# Patient Record
Sex: FEMALE | Race: WHITE | NOT HISPANIC OR LATINO | Employment: UNEMPLOYED | URBAN - METROPOLITAN AREA
[De-identification: names, ages, dates, MRNs, and addresses within clinical notes are randomized per-mention and may not be internally consistent; named-entity substitution may affect disease eponyms.]

---

## 2017-05-06 RX ORDER — SUMATRIPTAN SUCCINATE 100 MG/1
TABLET ORAL
Qty: 6 TABLET | Refills: 0 | Status: SHIPPED | OUTPATIENT
Start: 2017-05-06

## 2017-09-11 RX ORDER — SUMATRIPTAN SUCCINATE 100 MG/1
TABLET ORAL
Qty: 6 TABLET | Refills: 0 | OUTPATIENT
Start: 2017-09-11

## 2019-10-16 ENCOUNTER — TELEPHONE (OUTPATIENT)
Dept: OBSTETRICS AND GYNECOLOGY | Facility: CLINIC | Age: 39
End: 2019-10-16

## 2019-10-16 NOTE — TELEPHONE ENCOUNTER
----- Message from Maya Eid sent at 10/16/2019  3:18 PM CDT -----  Contact: patient  Type: Needs Medical Advice    Who Called:  patient  Symptoms (please be specific):    How long has patient had these symptoms:    Pharmacy name and phone #:    Best Call Back Number: [phone]  Additional Information: requesting a call back,stated that  performed hysterectomy and would like to talk with her

## 2019-11-25 ENCOUNTER — HOSPITAL ENCOUNTER (EMERGENCY)
Facility: HOSPITAL | Age: 39
Discharge: HOME | End: 2019-11-25
Attending: EMERGENCY MEDICINE
Payer: MEDICAID

## 2019-11-25 VITALS
RESPIRATION RATE: 18 BRPM | HEART RATE: 95 BPM | DIASTOLIC BLOOD PRESSURE: 75 MMHG | TEMPERATURE: 98 F | SYSTOLIC BLOOD PRESSURE: 120 MMHG

## 2019-11-25 DIAGNOSIS — K08.89 PAIN, DENTAL: Primary | ICD-10-CM

## 2019-11-25 DIAGNOSIS — K02.9 DENTAL CARIES: ICD-10-CM

## 2019-11-25 DIAGNOSIS — K04.7 DENTAL ABSCESS: ICD-10-CM

## 2019-11-25 PROCEDURE — 99283 EMERGENCY DEPT VISIT LOW MDM: CPT

## 2019-11-25 RX ORDER — DICLOFENAC SODIUM 50 MG/1
50 TABLET, DELAYED RELEASE ORAL 3 TIMES DAILY PRN
Qty: 20 TABLET | Refills: 2 | Status: SHIPPED | OUTPATIENT
Start: 2019-11-25

## 2019-11-25 RX ORDER — NAPROXEN 250 MG/1
500 TABLET ORAL
Status: DISCONTINUED | OUTPATIENT
Start: 2019-11-25 | End: 2019-11-25 | Stop reason: HOSPADM

## 2019-11-25 RX ORDER — CLINDAMYCIN HYDROCHLORIDE 300 MG/1
300 CAPSULE ORAL 4 TIMES DAILY
Qty: 40 CAPSULE | Refills: 0 | Status: SHIPPED | OUTPATIENT
Start: 2019-11-25 | End: 2019-12-05

## 2019-11-25 NOTE — ED PROVIDER NOTES
Encounter Date: 2019       History   No chief complaint on file.    Presents with dental pain  Onset 5 day  This is chronic.  Pt has not seen a dentist        Review of patient's allergies indicates:  No Known Allergies  Past Medical History:   Diagnosis Date    Abdominal adhesions     Anemia     Breast disorder     tumor removal    Endometriosis, adhesive      Past Surgical History:   Procedure Laterality Date    ABDOMINAL ADHESION SURGERY      2nd surgery 3/14/2013    BLADDER REPAIR      laparotomy for bladder repair after abdominal surgery for endometriosis     SECTION      HYSTERECTOMY  3/14/2013    MINI-LAPAROTOMY      for endometriosis    OOPHORECTOMY      TUMOR REMOVAL      Right breast     Family History   Problem Relation Age of Onset    Hypertension Paternal Grandfather     Heart disease Paternal Grandfather     Stroke Maternal Grandmother     Breast cancer Mother      Social History     Tobacco Use    Smoking status: Former Smoker     Packs/day: 1.00     Years: 3.00     Pack years: 3.00    Smokeless tobacco: Never Used   Substance Use Topics    Alcohol use: No     Alcohol/week: 0.0 standard drinks    Drug use: Yes     Comment: No drug use  in 4 years.     Review of Systems   Constitutional: Negative for fever.   HENT: Positive for dental problem.    Respiratory: Negative for cough, shortness of breath and wheezing.    Cardiovascular: Negative for chest pain, palpitations and leg swelling.   Gastrointestinal: Negative for abdominal pain, diarrhea, nausea and vomiting.   Genitourinary: Negative for dysuria.   Musculoskeletal: Negative for back pain.   Skin: Negative for rash.   Neurological: Negative for weakness.       Physical Exam     Initial Vitals   BP Pulse Resp Temp SpO2   -- -- -- -- --      MAP       --         Physical Exam    Constitutional: She appears well-developed and well-nourished.   HENT:   Head: Normocephalic and atraumatic.   Mouth/Throat: Oropharynx is  clear and moist.   Left lower back tooth with large caries noted.  Mild gum swelling at site  Airway patent    Eyes: Conjunctivae are normal.   Neck: Normal range of motion.   Cardiovascular: Normal rate and regular rhythm.   Pulmonary/Chest: Breath sounds normal. No respiratory distress.   Musculoskeletal: Normal range of motion.   Neurological: She is alert and oriented to person, place, and time. No sensory deficit. GCS score is 15. GCS eye subscore is 4. GCS verbal subscore is 5. GCS motor subscore is 6.   Skin: Skin is warm and dry.   Psychiatric: She has a normal mood and affect. Thought content normal.         ED Course   Procedures  Labs Reviewed - No data to display       Imaging Results    None          Medical Decision Making:   Initial Assessment:   Chronic dental pain  Onset 5 days  Have discussed this pt with DR. Yepez                                 Clinical Impression:       ICD-10-CM ICD-9-CM   1. Pain, dental K08.89 525.9   2. Dental abscess K04.7 522.5   3. Dental caries K02.9 521.00                             Amaya Osborne NP  11/25/19 1056

## 2020-03-06 DIAGNOSIS — M79.642 BILATERAL HAND PAIN: Primary | ICD-10-CM

## 2020-03-06 DIAGNOSIS — M79.641 BILATERAL HAND PAIN: Primary | ICD-10-CM

## 2020-05-07 DIAGNOSIS — M84.333S: Primary | ICD-10-CM

## 2020-06-29 ENCOUNTER — HOSPITAL ENCOUNTER (OUTPATIENT)
Dept: RADIOLOGY | Facility: HOSPITAL | Age: 40
Discharge: HOME | End: 2020-06-29
Attending: ORTHOPAEDIC SURGERY
Payer: COMMERCIAL

## 2020-06-29 ENCOUNTER — OFFICE VISIT (OUTPATIENT)
Dept: ORTHOPEDICS | Facility: CLINIC | Age: 40
End: 2020-06-29
Payer: COMMERCIAL

## 2020-06-29 VITALS
SYSTOLIC BLOOD PRESSURE: 138 MMHG | BODY MASS INDEX: 29.41 KG/M2 | DIASTOLIC BLOOD PRESSURE: 79 MMHG | HEART RATE: 93 BPM | WEIGHT: 166 LBS | TEMPERATURE: 98 F | HEIGHT: 63 IN

## 2020-06-29 DIAGNOSIS — M79.672 LEFT FOOT PAIN: ICD-10-CM

## 2020-06-29 DIAGNOSIS — M79.672 LEFT FOOT PAIN: Primary | ICD-10-CM

## 2020-06-29 DIAGNOSIS — S92.425A CLOSED NONDISPLACED FRACTURE OF DISTAL PHALANX OF LEFT GREAT TOE, INITIAL ENCOUNTER: Primary | ICD-10-CM

## 2020-06-29 PROCEDURE — 3008F BODY MASS INDEX DOCD: CPT | Mod: CPTII,S$GLB,, | Performed by: ORTHOPAEDIC SURGERY

## 2020-06-29 PROCEDURE — 73630 X-RAY EXAM OF FOOT: CPT | Mod: TC,PO,LT

## 2020-06-29 PROCEDURE — 99202 OFFICE O/P NEW SF 15 MIN: CPT | Mod: 25,S$GLB,, | Performed by: ORTHOPAEDIC SURGERY

## 2020-06-29 PROCEDURE — 3008F PR BODY MASS INDEX (BMI) DOCUMENTED: ICD-10-PCS | Mod: CPTII,S$GLB,, | Performed by: ORTHOPAEDIC SURGERY

## 2020-06-29 PROCEDURE — 73630 X-RAY EXAM OF FOOT: CPT | Mod: 26,LT,, | Performed by: RADIOLOGY

## 2020-06-29 PROCEDURE — 99202 PR OFFICE/OUTPT VISIT, NEW, LEVL II, 15-29 MIN: ICD-10-PCS | Mod: 25,S$GLB,, | Performed by: ORTHOPAEDIC SURGERY

## 2020-06-29 PROCEDURE — 73630 XR FOOT COMPLETE 3 VIEW LEFT: ICD-10-PCS | Mod: 26,LT,, | Performed by: RADIOLOGY

## 2020-06-29 PROCEDURE — 28490 TREAT BIG TOE FRACTURE: CPT | Mod: LT,S$GLB,, | Performed by: ORTHOPAEDIC SURGERY

## 2020-06-29 PROCEDURE — 99999 PR PBB SHADOW E&M-EST. PATIENT-LVL III: ICD-10-PCS | Mod: PBBFAC,,, | Performed by: ORTHOPAEDIC SURGERY

## 2020-06-29 PROCEDURE — 99999 PR PBB SHADOW E&M-EST. PATIENT-LVL III: CPT | Mod: PBBFAC,,, | Performed by: ORTHOPAEDIC SURGERY

## 2020-06-29 PROCEDURE — 28490 PR CLOSED RX BIG TOE FRACTURE: ICD-10-PCS | Mod: LT,S$GLB,, | Performed by: ORTHOPAEDIC SURGERY

## 2020-06-29 RX ORDER — HYDROCODONE BITARTRATE AND ACETAMINOPHEN 5; 325 MG/1; MG/1
1 TABLET ORAL EVERY 6 HOURS PRN
Qty: 24 TABLET | Refills: 0 | Status: SHIPPED | OUTPATIENT
Start: 2020-06-29

## 2020-06-30 PROBLEM — S92.425A CLOSED NONDISPLACED FRACTURE OF DISTAL PHALANX OF LEFT GREAT TOE: Status: ACTIVE | Noted: 2020-06-30

## 2020-06-30 NOTE — PROGRESS NOTES
"HPI: Mony Gifford  is a  40 y.o. female who complains of left foot pain. The pain is mainly in the great toe. The pain began acutely when she was on vacation in [place] and she dropped a large ice block on the toe. She rates her pain as 8/10 today. She was seen at [facility] in [place] and was given a darco shoe. DOI: 6/23/20.     PAST MEDICAL/SURGICAL/FAMILY/SOCIAL/ HISTORY: REVIEWED    ALLERGIES/MEDICATIONS: REVIEWED       Review of Systems:     Constitution: Negative.   HEENT: Negative.   Eyes: Negative.   Cardiovascular: Negative.   Respiratory: Negative.   Endocrine: Negative.   Hematologic/Lymphatic: Negative.   Skin: Negative.   Musculoskeletal: Positive for left great toe pain   Gastrointestinal: Negative.   Genitourinary: Negative.   Neurological: Negative.   Psychiatric/Behavioral: Negative.   Allergic/Immunologic: Negative.       PHYSICAL EXAM:  Vitals:    06/29/20 1531   BP: 138/79   Pulse: 93   Temp: 98.2 °F (36.8 °C)     Ht Readings from Last 1 Encounters:   06/29/20 5' 3" (1.6 m)     Wt Readings from Last 1 Encounters:   06/29/20 75.3 kg (166 lb 0.1 oz)       GENERAL: Well developed, well nourished, very anxious and tearful.   SKIN: Skin is intact. No atrophy, abrasions or lesions are noted.   Neurological: Normal mental status. Appropriate and conversant. Alert and oriented x 3.  GAIT: Walks with an antalgic gait.    Left  lower extremity compared with RLE:  2+ dorsalis pedis pulse.  Capillary refill < 3 seconds.  Limited exam due to pain. Sensation to light touch intact sural, saphenous, superficial peroneal and deep peroneal nerves. Mild to moderate swelling of the great toe, no ecchymosis or deformity. No lymphadenopathy, no masses or tumors palpated.  Pt will not allow me to touch the toe.     XRAYS:   3 views of left foot obtained and reviewed today reveal non-displaced fracture of the distal phalanx of the great toe.       ASSESSMENT:        Encounter Diagnosis   Name Primary?    Closed " nondisplaced fracture of distal phalanx of left great toe, initial encounter Yes        PLAN:   I spent 20 minutes in consulation with the patient today. More than half the time was spent counseling the patient on her condition. Non-operative treatment. Weight bearing as tolerated in darco shoe. Apply a compressive ACE bandage. Rest and elevate the affected painful area.  Apply cold compresses intermittently as needed.  As pain recedes, begin normal activities slowly as tolerated.  I wrote her a prescription for for norco 5 sig 1 po q 6 #24 RF0. F/u 1 month with xray of the left great toe.

## 2020-07-01 NOTE — TELEPHONE ENCOUNTER
Spoke with pt, she stated that the Norco is causing nausea and headaches. She requested to have the medication changed. I informed pt that Dr. Mcdermott is out of the office today, and this will be addressed as soon as she returns.  Also advised pt to discontinue the current medication if it is making her feel badly. Pt verbalized clear understanding.      ----- Message from Liz Lobo MA sent at 7/1/2020  9:29 AM CDT -----  Contact: pt  Medication, migraine and nausea   Please advise   Call back

## 2020-07-02 ENCOUNTER — TELEPHONE (OUTPATIENT)
Dept: ORTHOPEDICS | Facility: CLINIC | Age: 40
End: 2020-07-02

## 2020-07-02 RX ORDER — TRAMADOL HYDROCHLORIDE AND ACETAMINOPHEN 37.5; 325 MG/1; MG/1
1 TABLET, FILM COATED ORAL EVERY 8 HOURS PRN
Qty: 12 TABLET | Refills: 0 | Status: SHIPPED | OUTPATIENT
Start: 2020-07-02

## 2020-07-02 NOTE — TELEPHONE ENCOUNTER
----- Message from Liz Lobo MA sent at 7/2/2020 12:29 PM CDT -----  Contact: pt  Please call back, medication questions   Call back

## 2020-07-02 NOTE — TELEPHONE ENCOUNTER
----- Message from Liz Lobo MA sent at 7/2/2020  8:29 AM CDT -----  Talked to nurse yesterday 07/01  Call back

## 2020-07-02 NOTE — TELEPHONE ENCOUNTER
Notified patient that the prescription was sent to her pharmacy on file. Patient stated understanding.

## 2020-07-22 DIAGNOSIS — S92.425A CLOSED NONDISPLACED FRACTURE OF DISTAL PHALANX OF LEFT GREAT TOE, INITIAL ENCOUNTER: Primary | ICD-10-CM

## 2020-07-28 ENCOUNTER — TELEPHONE (OUTPATIENT)
Dept: ORTHOPEDICS | Facility: CLINIC | Age: 40
End: 2020-07-28

## 2020-07-28 NOTE — TELEPHONE ENCOUNTER
----- Message from Kayley Trujillo sent at 7/28/2020 12:33 PM CDT -----  Regarding: appt  Contact: pt  Appointment     Patient called and asked if you will be able to reschedule her appt to this Thursday.   Please call back [phone]    The pt does not have a  for today.

## 2020-08-04 RX ORDER — TRAMADOL HYDROCHLORIDE AND ACETAMINOPHEN 37.5; 325 MG/1; MG/1
1 TABLET, FILM COATED ORAL EVERY 8 HOURS PRN
Qty: 12 TABLET | Refills: 0 | OUTPATIENT
Start: 2020-08-04

## 2021-04-29 ENCOUNTER — PATIENT MESSAGE (OUTPATIENT)
Dept: RESEARCH | Facility: HOSPITAL | Age: 41
End: 2021-04-29